# Patient Record
Sex: FEMALE | Race: WHITE
[De-identification: names, ages, dates, MRNs, and addresses within clinical notes are randomized per-mention and may not be internally consistent; named-entity substitution may affect disease eponyms.]

---

## 2019-01-11 ENCOUNTER — HOSPITAL ENCOUNTER (EMERGENCY)
Dept: HOSPITAL 80 - FED | Age: 19
Discharge: HOME | End: 2019-01-11
Payer: MEDICAID

## 2019-01-11 VITALS — SYSTOLIC BLOOD PRESSURE: 118 MMHG | DIASTOLIC BLOOD PRESSURE: 75 MMHG

## 2019-01-11 DIAGNOSIS — H81.10: Primary | ICD-10-CM

## 2019-01-11 NOTE — EDPHY
General


Time Seen by Provider: 01/11/19 20:45


Narrative: 





CLINICAL IMPRESSION: Benign paroxysmal positional vertigo 


_________________


ASSESSMENT/PLAN:


 18-year-old female presents to the emergency department with vague complaints 

of a tremulous sensation in her brain, worse with movement of the head for the 

last 2 days.  Patient is alert, oriented with no focal neurological deficits, 

NIH score of 0 with no limb ataxia.  Vital signs stable.  No complaints of 

chest pain, shortness of breath, recent illness, GI complaints, chronic 

vomiting or diarrhea.  She was found to have mild rotary nystagmus with Macario-

Hallpike testing and the Epley maneuver was performed to the right.  I do not 

suspect central causes for vertigo at this time.  She has no coinciding 

complaints of headache, neck pain, tinnitus, hearing loss, acute vision changes

, paresthesias, or unilateral weakness.  Supportive care discussed at home, ENT 

follow-up recommended, PCP follow-up encouraged, warning signs return to ED 

sooner outlined and discharge.


_________________


DIFFERENTIAL DX:


Dizziness including but not limited to peripheral and central causes of vertigo

, orthostatic causes including dehydration, and blood loss.


_________________


ED PROCEDURES:


 See lab and/or imaging results below


_________________


CHIEF COMPLAINT:  Vertigo, dizziness


_________________


HPI:


 18-year-old otherwise healthy female presents to the emergency department with 

complaints of a"tremory sensation inside my brain".  She does not report 

headache.  She states this is not a spinning sensation but that she feels like 

her brain is not working right.  No associated vision or hearing change, 

tinnitus, ear pain, recent URI symptoms.  No nausea, vomiting, diarrhea, 

abdominal pain or concern for dehydration.  Symptoms are not correlated with 

going from sitting to standing quickly.  She has no associated chest pain or 

shortness of breath.  No history of anemia.  She has never had symptoms like 

this before.  No history of congenital heart disease or family history of 

cardiac problems at a young age.  She is taking an SSRI but has been on the 

same dose for the last 2 months and states she has experienced dizziness from 

these in the past and this sensation feels different.  This sensation is 

worsened by movement of her head.  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 

difficulty going to work.  No reported head trauma


_________________


PAST MEDICAL HISTORY: 


Depression See triage summary and nurse notes for addition applicable history 





Pertinent Past Surgical History:  None reported





Family History:  No family history of cardiac disease





Social History:  Otherwise healthy, up-to-date on vaccines, denies illicit drugs


_________________


REVIEW OF SYSTEMS:


A full 10 point review of systems was negative except for those mentioned in 

HPI. 


_________________


PHYSICAL EXAM:


General Appearance:  Alert, oriented, appropriate, cooperative, NAD, well 

hydrated, non-toxic appearing, VSS, no hypoxia.


HEENT: TMs are clear bilaterally no perforation or FB, no injection, no 

evidence of serous or mucopurulent otitis. Oropharynx clear is no erythema or 

exudates, no tonsillar hypertrophy or asymmetry.  Dentition without abnormality.


Eyes: PERRLA, no acute vision change, mild rotary nystagmus noted with Oneco-

Hallpike testing to the right, no swelling, discharge, pain or 

photosensitivity. Conjunctiva pink, no pallor or injection


Neck: Supple, nontender, no lymphadenopathy, no midline pain, FROM, no 

meningismus.


Respiratory:  There are no retractions, lungs are clear to auscultation.


Cardiac:  Regular rate and rhythm, no murmurs or gallops.


Gastrointestinal: Abdomen is soft, nontender, bowel sounds normal, no masses/

hernia, no rigidity, guarding or focal peritoneal findings.


Skin: Warm, dry, no rashes, no nodules on palpation.


Neuro:  Alert and oriented x3, cranial nerves 2-12 grossly intact, NIH score 0 

with no limb ataxia.


_________________


MEDICAL DECISION MAKING:


Patient was seen independently. Secondary supervising physician at time of 

evaluation was:  Dr Del Rio.


Diagnosis: Benign paroxysmal positional vertigo . New, requires workup


Summary: See Assessment and Plan for summary of ED visit 








Patient Progress:  Stable. 





- History


Smoking Status: Never smoked





- Objective


Vital Signs: 


 Initial Vital Signs











Temperature (C)  37 C   01/11/19 19:10


 


Heart Rate  75   01/11/19 19:10


 


Respiratory Rate  16   01/11/19 19:10


 


Blood Pressure  123/76 H  01/11/19 19:10


 


O2 Sat (%)  98   01/11/19 19:10








 











O2 Delivery Mode               Room Air














Allergies/Adverse Reactions: 


 





No Known Allergies Allergy (Unverified 01/11/19 19:09)


 








Home Medications: 














 Medication  Instructions  Recorded


 


Escitalopram Oxalate  01/11/19


 


Ranitidine HCl  01/11/19














Departure





- Departure


Disposition: Home, Routine, Self-Care


Clinical Impression: 


 Benign paroxysmal positional vertigo





Condition: Good


Instructions:  Benign Paroxysmal Positional Vertigo (ED)


Additional Instructions: 


DISCHARGE INSTRUCTIONS FROM YOUR DOCTOR 


Thank you for visiting our emergency department today. Please keep in mind that 

discharge from the emergency department does not mean that there is nothing 

wrong - it simply means that we have not identified an emergency condition that 

requires further evaluation or treatment in the hospital. You should always 

plan to follow up with primary care for re-evaluation of your condition in the 

next 2-3 days. If you have been referred to a specialist, please call as soon 

as possible (today or tomorrow) to schedule your follow up appointment at the 

appropriate time. 





 YOU WERE DIAGNOSED WITH BENIGN POSITIONAL VERTIGO.  THE EPLEY MANEUVER WAS 

PERFORMED TONIGHT.  PLEASE FOLLOW-UP WITH ENT.  A REFERRAL WAS GIVEN.  PLEASE 

SLEEP AT 45 DEGREE ANGLE FOR THE NEXT 2 DAYS, GRADUATE TO 2 PILLOWS, DO NOT LIE 

FLAT FOR 5 DAYS.  AVOID YOGA OR ANY ACTIVITIES WITH HER HEAD BELOW YOUR HEART.  

RETURN TO THE EMERGENCY DEPARTMENT FOR WORSENING OR PERSISTENT VERTIGO, 

HEADACHES, ALTERED MENTAL STATUS, VOMITING, SEIZURE ACTIVITY, FEVER OR ANY 

OTHER CONCERNS.





People present with illnesses and injuries in different ways, and it is always 

possible that we have missed something. You may always return for re-evaluation 

if symptoms worsen or if they are not improving or if you develop new/different 

symptoms. 


Again, thank you for choosing our emergency department. We hope that you feel 

better.


Referrals: 


Bruce Valero MD [Primary Care Provider] - As per Instructions


Bao Gordon MD [Medical Doctor] - As per Instructions